# Patient Record
Sex: FEMALE
[De-identification: names, ages, dates, MRNs, and addresses within clinical notes are randomized per-mention and may not be internally consistent; named-entity substitution may affect disease eponyms.]

---

## 2017-10-27 ENCOUNTER — HOSPITAL ENCOUNTER (OUTPATIENT)
Dept: HOSPITAL 14 - H.ER | Age: 52
Setting detail: OBSERVATION
LOS: 1 days | Discharge: HOME | End: 2017-10-28
Attending: INTERNAL MEDICINE | Admitting: INTERNAL MEDICINE
Payer: COMMERCIAL

## 2017-10-27 DIAGNOSIS — E78.1: ICD-10-CM

## 2017-10-27 DIAGNOSIS — E78.5: ICD-10-CM

## 2017-10-27 DIAGNOSIS — Z79.899: ICD-10-CM

## 2017-10-27 DIAGNOSIS — Z23: ICD-10-CM

## 2017-10-27 DIAGNOSIS — R51: Primary | ICD-10-CM

## 2017-10-27 DIAGNOSIS — E78.00: ICD-10-CM

## 2017-10-27 PROCEDURE — 81025 URINE PREGNANCY TEST: CPT

## 2017-10-27 PROCEDURE — 71020: CPT

## 2017-10-27 PROCEDURE — 99285 EMERGENCY DEPT VISIT HI MDM: CPT

## 2017-10-27 PROCEDURE — 93005 ELECTROCARDIOGRAM TRACING: CPT

## 2017-10-27 PROCEDURE — 81003 URINALYSIS AUTO W/O SCOPE: CPT

## 2017-10-27 PROCEDURE — 80053 COMPREHEN METABOLIC PANEL: CPT

## 2017-10-27 PROCEDURE — 84484 ASSAY OF TROPONIN QUANT: CPT

## 2017-10-27 PROCEDURE — 85025 COMPLETE CBC W/AUTO DIFF WBC: CPT

## 2017-10-28 VITALS
SYSTOLIC BLOOD PRESSURE: 104 MMHG | RESPIRATION RATE: 18 BRPM | OXYGEN SATURATION: 99 % | DIASTOLIC BLOOD PRESSURE: 67 MMHG | TEMPERATURE: 98.1 F | HEART RATE: 76 BPM

## 2017-10-28 LAB
ALBUMIN/GLOB SERPL: 1.4 {RATIO} (ref 1–2.1)
ALP SERPL-CCNC: 95 U/L (ref 38–126)
ALT SERPL-CCNC: 50 U/L (ref 9–52)
AST SERPL-CCNC: 84 U/L (ref 14–36)
BACTERIA #/AREA URNS HPF: (no result) /[HPF]
BASOPHILS # BLD AUTO: 0.1 K/UL (ref 0–0.2)
BASOPHILS NFR BLD: 0.8 % (ref 0–2)
BILIRUB SERPL-MCNC: 0.2 MG/DL (ref 0.2–1.3)
BILIRUB UR-MCNC: NEGATIVE MG/DL
BUN SERPL-MCNC: 16 MG/DL (ref 7–17)
CALCIUM SERPL-MCNC: 9.8 MG/DL (ref 8.4–10.2)
CHLORIDE SERPL-SCNC: 107 MMOL/L (ref 98–107)
CO2 SERPL-SCNC: 25 MMOL/L (ref 22–30)
COLOR UR: (no result)
EOSINOPHIL # BLD AUTO: 0.1 K/UL (ref 0–0.7)
EOSINOPHIL NFR BLD: 0.9 % (ref 0–4)
ERYTHROCYTE [DISTWIDTH] IN BLOOD BY AUTOMATED COUNT: 13.3 % (ref 11.5–14.5)
GLOBULIN SER-MCNC: 3.4 GM/DL (ref 2.2–3.9)
GLUCOSE SERPL-MCNC: 98 MG/DL (ref 65–105)
GLUCOSE UR STRIP-MCNC: (no result) MG/DL
HCT VFR BLD CALC: 40.8 % (ref 34–47)
KETONES UR STRIP-MCNC: NEGATIVE MG/DL
LEUKOCYTE ESTERASE UR-ACNC: (no result) LEU/UL
LYMPHOCYTES # BLD AUTO: 2 K/UL (ref 1–4.3)
LYMPHOCYTES NFR BLD AUTO: 24.2 % (ref 20–40)
MCH RBC QN AUTO: 29.6 PG (ref 27–31)
MCHC RBC AUTO-ENTMCNC: 33.9 G/DL (ref 33–37)
MCV RBC AUTO: 87.3 FL (ref 81–99)
MONOCYTES # BLD: 0.6 K/UL (ref 0–0.8)
MONOCYTES NFR BLD: 7.5 % (ref 0–10)
NEUTROPHILS # BLD: 5.5 K/UL (ref 1.8–7)
NEUTROPHILS NFR BLD AUTO: 66.6 % (ref 50–75)
NRBC BLD AUTO-RTO: 0.1 % (ref 0–0)
PH UR STRIP: 7 [PH] (ref 5–8)
PLATELET # BLD: 235 K/UL (ref 130–400)
PMV BLD AUTO: 9 FL (ref 7.2–11.7)
POTASSIUM SERPL-SCNC: 4 MMOL/L (ref 3.6–5)
PROT SERPL-MCNC: 8 G/DL (ref 6.3–8.2)
PROT UR STRIP-MCNC: NEGATIVE MG/DL
RBC # UR STRIP: NEGATIVE /UL
RBC #/AREA URNS HPF: 3 /HPF (ref 0–3)
SODIUM SERPL-SCNC: 145 MMOL/L (ref 132–148)
SP GR UR STRIP: 1.01 (ref 1–1.03)
UROBILINOGEN UR-MCNC: (no result) MG/DL (ref 0.2–1)
WBC # BLD AUTO: 8.2 K/UL (ref 4.8–10.8)
WBC #/AREA URNS HPF: 1 /HPF (ref 0–5)

## 2017-10-28 NOTE — CARD
--------------- APPROVED REPORT --------------





EKG Measurement

Heart [id number]

HI [id number]

[id number]

[id number]

[id number]



<Conclusion>

Normal sinus rhythm

Nonspecific T wave abnormality

Abnormal ECG

## 2017-10-28 NOTE — CARD
--------------- APPROVED REPORT --------------





EKG Measurement

Heart [id number]

IN [id number]

[id number]

[id number]

[id number]



<Conclusion>

Normal sinus rhythm

Cannot rule out Anterior infarct, age undetermined

Abnormal ECG

## 2017-10-28 NOTE — ED PDOC
HPI: Chest Pain


Time Seen by Provider: 10/27/17 23:40


Chief Complaint (Nursing): Chest Pain


Chief Complaint (Provider): Chest Pain


History Per: Patient


History/Exam Limitations: no limitations


Current Symptoms Are (Timing): Still Present


Additional Complaint(s): 





Patient is a 53 y/o  female with a history of hypertriglyceridemia and 

dyslipidemia who presents to the ED with acute left sided chest pain that 

radiates down her left arm. She admits to nausea and a mild headache but denies 

vomiting and diaphoresis.





PMD: None Provided





Past Medical History


Reviewed: Historical Data, Nursing Documentation, Vital Signs


Vital Signs: 


 Last Vital Signs











Temp  98.1 F   10/28/17 05:09


 


Pulse  60   10/28/17 05:09


 


Resp  18   10/28/17 05:09


 


BP  113/74   10/28/17 05:09


 


Pulse Ox  98   10/28/17 05:09














- Medical History


PMH: Hypercholesterolemia


Other PMH: hypertriglyceridemia, dyslipidemia 





- Surgical History


Surgical History: No Surg Hx





- Family History


Family History: States: Unknown Family Hx





- Social History


Current smoker - smoking cessation education provided: No


Ex-Smoker (has not smoked in the last 12 months): No


Alcohol: None


Drugs: Denies





- Home Medications


Home Medications: 


 Ambulatory Orders











 Medication  Instructions  Recorded


 


Simvastatin [Zocor] 20 mg PO DAILY 10/28/17














- Allergies


Allergies/Adverse Reactions: 


 Allergies











Allergy/AdvReac Type Severity Reaction Status Date / Time


 


No Known Allergies Allergy   Verified 11/29/16 13:37














Review of Systems


ROS Statement: Except As Marked, All Systems Reviewed And Found Negative


Constitutional: Negative for: Sweats


Cardiovascular: Positive for: Chest Pain


Gastrointestinal: Positive for: Nausea.  Negative for: Vomiting





Physical Exam





- Reviewed


Nursing Documentation Reviewed: Yes


Vital Signs Reviewed: Yes





- Physical Exam


Appears: Positive for: Non-toxic, No Acute Distress


Head Exam: Positive for: ATRAUMATIC, NORMAL INSPECTION, NORMOCEPHALIC


Skin: Positive for: Normal Color, Warm, Dry


Eye Exam: Positive for: Normal appearance, EOMI, PERRL.  Negative for: Nystagmus


ENT: Positive for: Normal ENT Inspection


Neck: Positive for: Normal, Painless ROM, Supple


Cardiovascular/Chest: Positive for: Regular Rate, Rhythm.  Negative for: Edema, 

Murmur


Respiratory: Positive for: Normal Breath Sounds.  Negative for: Wheezing, 

Respiratory Distress


Gastrointestinal/Abdominal: Positive for: Normal Exam, Bowel Sounds, Soft.  

Negative for: Tenderness


Back: Positive for: Normal Inspection


Extremity: Positive for: Normal ROM.  Negative for: Pedal Edema, Deformity


Neurologic/Psych: Positive for: Alert, Oriented





- Laboratory Results


Result Diagrams: 


 10/28/17 01:32





 10/28/17 01:32





- ECG


O2 Sat by Pulse Oximetry: 99 (RA)


Pulse Ox Interpretation: Normal





- Radiology


X-Ray: Interpreted by Me, Viewed By Me


X-Ray Interpretation: No Acute Disease





Medical Decision Making


Medical Decision Making: 





Time: 23:50


Initial Impression: 53 y/o female with chest pain in setting of dyslipidemia


Initial Plan:


--EKG


--CMP


--Troponin I


--Urine Pregnancy


--CBC


--CXR


--Aspirin


--Urinalysis





Time: 00:00


--Chest XR negative





Time: 2:20


--Labs were viewed and show no clinically significant abnormalities


--Patient placed on chest pain observation given risk factors including 

hypertriglycemia and dyslipidemia


--Referred to Dr. Kwong





--------------------------------------------------------------------------------

---------------


Scribe Attestation:


Documented by Kike Fowler, acting as a scribe for Dr. Porfirio Guerra MD.





Provider Scribe Attestation:


All medical record entries made by the Scribe were at my direction and 

personally dictated by me. I have reviewed the chart and agree that the record 

accurately reflects my personal performance of the history, physical exam, 

medical decision making, and the department course for this patient. I have 

also personally directed, reviewed, and agree with the discharge instructions 

and disposition.





Disposition





- Clinical Impression


Clinical Impression: 


 Chest pain








- Patient ED Disposition


Is Patient to be Admitted: Yes


Discussed With : Mando Mosley





- Disposition


Disposition Time: 01:00


Condition: FAIR





- Pt Status Changed To:


Hospital Disposition Of: Observation





- POA


Present On Arrival: None

## 2017-10-28 NOTE — CP.PCM.HP
Past Patient History





- Infectious Disease


Hx of Infectious Diseases: None





- Past Medical History & Family History


Past Medical History?: Yes





- Past Social History


Alcohol: None


Drugs: Denies





- CARDIAC


Hx Hypercholesterolemia: Yes





- PULMONARY


Hx Respiratory Disorders: No





- NEUROLOGICAL


Hx Neurological Disorder: No





- HEENT


Hx HEENT Problems: No





- RENAL


Hx Chronic Kidney Disease: No





- ENDOCRINE/METABOLIC


Hx Endocrine Disorders: No





- HEMATOLOGICAL/ONCOLOGICAL


Hx Blood Disorders: No


Hx AIDS: No


Hx Human Immunodeficiency Virus (HIV): No





- INTEGUMENTARY


Hx Dermatological Problems: No





- MUSCULOSKELETAL/RHEUMATOLOGICAL


Hx Musculoskeletal Disorders: No


Hx Falls: No





- GASTROINTESTINAL


Hx Gastrointestinal Disorders: No





- GENITOURINARY/GYNECOLOGICAL


Hx Genitourinary Disorders: No





- PSYCHIATRIC


Hx Psychophysiologic Disorder: No


Hx Substance Use: No





- SURGICAL HISTORY


Hx Surgeries: No





- ANESTHESIA


Hx Anesthesia: Yes


Hx Anesthesia Reactions: No


Hx Malignant Hyperthermia: No





Meds


Allergies/Adverse Reactions: 


 Allergies











Allergy/AdvReac Type Severity Reaction Status Date / Time


 


No Known Allergies Allergy   Verified 11/29/16 13:37














Results





- Vital Signs


Recent Vital Signs: 





 Last Vital Signs











Temp  98.1 F   10/28/17 17:01


 


Pulse  76   10/28/17 17:01


 


Resp  18   10/28/17 17:01


 


BP  104/67   10/28/17 17:01


 


Pulse Ox  99   10/28/17 17:01














- Labs


Result Diagrams: 


 10/28/17 01:32





 10/28/17 01:32


Labs: 





 Laboratory Results - last 24 hr











  10/28/17 10/28/17 10/28/17





  01:32 01:32 02:02


 


WBC  8.2  


 


RBC  4.67  


 


Hgb  13.8  


 


Hct  40.8  


 


MCV  87.3  


 


MCH  29.6  


 


MCHC  33.9  


 


RDW  13.3  


 


Plt Count  235  


 


MPV  9.0  


 


Neut % (Auto)  66.6  


 


Lymph % (Auto)  24.2  


 


Mono % (Auto)  7.5  


 


Eos % (Auto)  0.9  


 


Baso % (Auto)  0.8  


 


Neut #  5.5  


 


Lymph #  2.0  


 


Mono #  0.6  


 


Eos #  0.1  


 


Baso #  0.1  


 


Sodium   145 


 


Potassium   4.0 


 


Chloride   107 


 


Carbon Dioxide   25 


 


Anion Gap   18 


 


BUN   16 


 


Creatinine   0.7 


 


Est GFR ( Amer)   > 60 


 


Est GFR (Non-Af Amer)   > 60 


 


Random Glucose   98 


 


Calcium   9.8 


 


Total Bilirubin   0.2 


 


AST   84 H 


 


ALT   50 


 


Alkaline Phosphatase   95 


 


Troponin I   < 0.0120 


 


Total Protein   8.0 


 


Albumin   4.6 


 


Globulin   3.4 


 


Albumin/Globulin Ratio   1.4 


 


Urine Color    Blue


 


Urine Clarity    Cloudy


 


Urine pH    7.0


 


Ur Specific Gravity    1.006


 


Urine Protein    Negative


 


Urine Glucose (UA)    Neg


 


Urine Ketones    Negative


 


Urine Blood    Negative


 


Urine Nitrate    Negative


 


Urine Bilirubin    Negative


 


Urine Urobilinogen    0.2-1.0


 


Ur Leukocyte Esterase    Neg


 


Urine RBC (Auto)    3


 


Urine Microscopic WBC    1


 


Ur Squamous Epith Cells    < 1


 


Amorphous Sediment    Occ H


 


Urine Bacteria    Trace














  10/28/17 10/28/17





  09:00 14:30


 


WBC  


 


RBC  


 


Hgb  


 


Hct  


 


MCV  


 


MCH  


 


MCHC  


 


RDW  


 


Plt Count  


 


MPV  


 


Neut % (Auto)  


 


Lymph % (Auto)  


 


Mono % (Auto)  


 


Eos % (Auto)  


 


Baso % (Auto)  


 


Neut #  


 


Lymph #  


 


Mono #  


 


Eos #  


 


Baso #  


 


Sodium  


 


Potassium  


 


Chloride  


 


Carbon Dioxide  


 


Anion Gap  


 


BUN  


 


Creatinine  


 


Est GFR ( Amer)  


 


Est GFR (Non-Af Amer)  


 


Random Glucose  


 


Calcium  


 


Total Bilirubin  


 


AST  


 


ALT  


 


Alkaline Phosphatase  


 


Troponin I  < 0.0120  < 0.0120


 


Total Protein  


 


Albumin  


 


Globulin  


 


Albumin/Globulin Ratio  


 


Urine Color  


 


Urine Clarity  


 


Urine pH  


 


Ur Specific Gravity  


 


Urine Protein  


 


Urine Glucose (UA)  


 


Urine Ketones  


 


Urine Blood  


 


Urine Nitrate  


 


Urine Bilirubin  


 


Urine Urobilinogen  


 


Ur Leukocyte Esterase  


 


Urine RBC (Auto)  


 


Urine Microscopic WBC  


 


Ur Squamous Epith Cells  


 


Amorphous Sediment  


 


Urine Bacteria

## 2017-10-28 NOTE — CP.PCM.DIS
Provider





- Provider


Date of Admission: 


10/28/17 01:42





Attending physician: 


Mando Mosley MD





Time Spent in preparation of Discharge (in minutes): 15





Hospital Course





- Lab Results


Lab Results: 


 Most Recent Lab Values











WBC  8.2 K/uL (4.8-10.8)   10/28/17  01:32    


 


RBC  4.67 Mil/uL (3.80-5.20)   10/28/17  01:32    


 


Hgb  13.8 g/dL (12.0-16.0)   10/28/17  01:32    


 


Hct  40.8 % (34.0-47.0)   10/28/17  01:32    


 


MCV  87.3 fl (81.0-99.0)   10/28/17  01:32    


 


MCH  29.6 pg (27.0-31.0)   10/28/17  01:32    


 


MCHC  33.9 g/dL (33.0-37.0)   10/28/17  01:32    


 


RDW  13.3 % (11.5-14.5)   10/28/17  01:32    


 


Plt Count  235 K/uL (130-400)   10/28/17  01:32    


 


MPV  9.0 fl (7.2-11.7)   10/28/17  01:32    


 


Neut % (Auto)  66.6 % (50.0-75.0)   10/28/17  01:32    


 


Lymph % (Auto)  24.2 % (20.0-40.0)   10/28/17  01:32    


 


Mono % (Auto)  7.5 % (0.0-10.0)   10/28/17  01:32    


 


Eos % (Auto)  0.9 % (0.0-4.0)   10/28/17  01:32    


 


Baso % (Auto)  0.8 % (0.0-2.0)   10/28/17  01:32    


 


Neut #  5.5 K/uL (1.8-7.0)   10/28/17  01:32    


 


Lymph #  2.0 K/uL (1.0-4.3)   10/28/17  01:32    


 


Mono #  0.6 K/uL (0.0-0.8)   10/28/17  01:32    


 


Eos #  0.1 K/uL (0.0-0.7)   10/28/17  01:32    


 


Baso #  0.1 K/uL (0.0-0.2)   10/28/17  01:32    


 


Sodium  145 mmol/l (132-148)   10/28/17  01:32    


 


Potassium  4.0 MMOL/L (3.6-5.0)   10/28/17  01:32    


 


Chloride  107 mmol/L ()   10/28/17  01:32    


 


Carbon Dioxide  25 mmol/L (22-30)   10/28/17  01:32    


 


Anion Gap  18  (10-20)   10/28/17  01:32    


 


BUN  16 mg/dl (7-17)   10/28/17  01:32    


 


Creatinine  0.7 mg/dL (0.7-1.2)   10/28/17  01:32    


 


Est GFR ( Amer)  > 60   10/28/17  01:32    


 


Est GFR (Non-Af Amer)  > 60   10/28/17  01:32    


 


Random Glucose  98 mg/dL ()   10/28/17  01:32    


 


Calcium  9.8 mg/dL (8.4-10.2)   10/28/17  01:32    


 


Total Bilirubin  0.2 mg/dl (0.2-1.3)   10/28/17  01:32    


 


AST  84 U/L (14-36)  H  10/28/17  01:32    


 


ALT  50 U/L (9-52)   10/28/17  01:32    


 


Alkaline Phosphatase  95 U/L ()   10/28/17  01:32    


 


Troponin I  < 0.0120 ng/mL (0.00-0.120)   10/28/17  14:30    


 


Total Protein  8.0 G/DL (6.3-8.2)   10/28/17  01:32    


 


Albumin  4.6 g/dL (3.5-5.0)   10/28/17  01:32    


 


Globulin  3.4 gm/dL (2.2-3.9)   10/28/17  01:32    


 


Albumin/Globulin Ratio  1.4  (1.0-2.1)   10/28/17  01:32    


 


Urine Color  Blue  (YELLOW)   10/28/17  02:02    


 


Urine Clarity  Cloudy  (Clear)   10/28/17  02:02    


 


Urine pH  7.0  (5.0-8.0)   10/28/17  02:02    


 


Ur Specific Gravity  1.006  (1.003-1.030)   10/28/17  02:02    


 


Urine Protein  Negative mg/dL (NEGATIVE)   10/28/17  02:02    


 


Urine Glucose (UA)  Neg mg/dL (Normal)   10/28/17  02:02    


 


Urine Ketones  Negative mg/dL (NEGATIVE)   10/28/17  02:02    


 


Urine Blood  Negative  (NEGATIVE)   10/28/17  02:02    


 


Urine Nitrate  Negative  (NEGATIVE)   10/28/17  02:02    


 


Urine Bilirubin  Negative  (NEGATIVE)   10/28/17  02:02    


 


Urine Urobilinogen  0.2-1.0 mg/dL (0.2-1.0)   10/28/17  02:02    


 


Ur Leukocyte Esterase  Neg Cynthia/uL (Negative)   10/28/17  02:02    


 


Urine RBC (Auto)  3 /hpf (0-3)   10/28/17  02:02    


 


Urine Microscopic WBC  1 /hpf (0-5)   10/28/17  02:02    


 


Ur Squamous Epith Cells  < 1 /hpf (0-5)   10/28/17  02:02    


 


Amorphous Sediment  Occ /ul (<OCC)  H  10/28/17  02:02    


 


Urine Bacteria  Trace  (<OCC)   10/28/17  02:02    














Discharge Exam





- Head Exam


Head Exam: ATRAUMATIC, NORMAL INSPECTION, NORMOCEPHALIC





Discharge Plan





- Discharge Medications


Prescriptions: 


Aspirin [Aspirin Chewable] 81 mg PO DAILY #30 ctb


Naproxen [Naprosyn] 500 mg PO BID PRN #20 tablet


 PRN Reason: Headache





- Follow Up Plan


Condition: FAIR


Disposition: HOME/ ROUTINE

## 2017-10-28 NOTE — RAD
HISTORY:



COMPARISON:

No prior.



TECHNIQUE:

Chest PA and lateral



FINDINGS:



LINES AND TUBES:

None. 



LUNG AND PLEURA:

The lungs are well inflated and clear. 



HEART AND MEDIASTINUM:

The heart is not enlarged. The hilar and mediastinal contours are 

within normal limits.



SKELETAL STRUCTURES:

The bony structures are within normal limits for the patient's age.



VISUALIZED UPPER ABDOMEN:

Normal.



OTHER FINDINGS:

None.



IMPRESSION:

No active pulmonary disease.

## 2017-10-29 NOTE — CARD
--------------- APPROVED REPORT --------------





EKG Measurement

Heart [id number]

RI 182P39

[id number]

[id number]

[id number]



<Conclusion>

Normal sinus rhythm

Nonspecific T wave abnormality

Abnormal ECG

## 2018-04-17 ENCOUNTER — HOSPITAL ENCOUNTER (OUTPATIENT)
Dept: HOSPITAL 14 - H.ER | Age: 53
Setting detail: OBSERVATION
LOS: 2 days | Discharge: HOME | End: 2018-04-19
Attending: FAMILY MEDICINE | Admitting: FAMILY MEDICINE
Payer: COMMERCIAL

## 2018-04-17 DIAGNOSIS — K85.90: Primary | ICD-10-CM

## 2018-04-17 DIAGNOSIS — E78.1: ICD-10-CM

## 2018-04-17 DIAGNOSIS — E78.00: ICD-10-CM

## 2018-04-17 LAB
ALBUMIN SERPL-MCNC: 4.4 G/DL (ref 3.5–5)
ALBUMIN/GLOB SERPL: 1.2 {RATIO} (ref 1–2.1)
ALT SERPL-CCNC: 35 U/L (ref 9–52)
AMYLASE SERPL-CCNC: 257 U/L (ref 30–110)
APTT BLD: 36.1 SECONDS (ref 25.6–37.1)
AST SERPL-CCNC: 29 U/L (ref 14–36)
BASOPHILS # BLD AUTO: 0.1 K/UL (ref 0–0.2)
BASOPHILS NFR BLD: 0.7 % (ref 0–2)
BUN SERPL-MCNC: 15 MG/DL (ref 7–17)
CALCIUM SERPL-MCNC: 9.2 MG/DL (ref 8.4–10.2)
EOSINOPHIL # BLD AUTO: 0.1 K/UL (ref 0–0.7)
EOSINOPHIL NFR BLD: 1.4 % (ref 0–4)
ERYTHROCYTE [DISTWIDTH] IN BLOOD BY AUTOMATED COUNT: 13.1 % (ref 11.5–14.5)
GFR NON-AFRICAN AMERICAN: > 60
HGB BLD-MCNC: 13.6 G/DL (ref 12–16)
INR PPP: 1 (ref 0.9–1.2)
LIPASE SERPL-CCNC: 2334 U/L (ref 23–300)
LYMPHOCYTES # BLD AUTO: 2.7 K/UL (ref 1–4.3)
LYMPHOCYTES NFR BLD AUTO: 26.6 % (ref 20–40)
MCH RBC QN AUTO: 29.7 PG (ref 27–31)
MCHC RBC AUTO-ENTMCNC: 33.6 G/DL (ref 33–37)
MCV RBC AUTO: 88.4 FL (ref 81–99)
MONOCYTES # BLD: 0.7 K/UL (ref 0–0.8)
MONOCYTES NFR BLD: 6.5 % (ref 0–10)
NEUTROPHILS # BLD: 6.6 K/UL (ref 1.8–7)
NEUTROPHILS NFR BLD AUTO: 64.8 % (ref 50–75)
NRBC BLD AUTO-RTO: 0.1 % (ref 0–0)
PLATELET # BLD: 243 K/UL (ref 130–400)
PMV BLD AUTO: 8.6 FL (ref 7.2–11.7)
PROTHROMBIN TIME: 10.7 SECONDS (ref 9.8–13.1)
RBC # BLD AUTO: 4.58 MIL/UL (ref 3.8–5.2)
WBC # BLD AUTO: 10.2 K/UL (ref 4.8–10.8)

## 2018-04-17 PROCEDURE — 85025 COMPLETE CBC W/AUTO DIFF WBC: CPT

## 2018-04-17 PROCEDURE — 80053 COMPREHEN METABOLIC PANEL: CPT

## 2018-04-17 PROCEDURE — 80061 LIPID PANEL: CPT

## 2018-04-17 PROCEDURE — 76705 ECHO EXAM OF ABDOMEN: CPT

## 2018-04-17 PROCEDURE — 81025 URINE PREGNANCY TEST: CPT

## 2018-04-17 PROCEDURE — 83690 ASSAY OF LIPASE: CPT

## 2018-04-17 PROCEDURE — 96374 THER/PROPH/DIAG INJ IV PUSH: CPT

## 2018-04-17 PROCEDURE — 85610 PROTHROMBIN TIME: CPT

## 2018-04-17 PROCEDURE — 82150 ASSAY OF AMYLASE: CPT

## 2018-04-17 PROCEDURE — 36415 COLL VENOUS BLD VENIPUNCTURE: CPT

## 2018-04-17 PROCEDURE — 96360 HYDRATION IV INFUSION INIT: CPT

## 2018-04-17 PROCEDURE — 99285 EMERGENCY DEPT VISIT HI MDM: CPT

## 2018-04-17 PROCEDURE — 83615 LACTATE (LD) (LDH) ENZYME: CPT

## 2018-04-17 PROCEDURE — 85730 THROMBOPLASTIN TIME PARTIAL: CPT

## 2018-04-17 RX ADMIN — SODIUM CHLORIDE, SODIUM LACTATE, POTASSIUM CHLORIDE, CALCIUM CHLORIDE AND DEXTROSE MONOHYDRATE SCH MLS/HR: 5; 600; 310; 30; 20 INJECTION, SOLUTION INTRAVENOUS at 23:28

## 2018-04-17 NOTE — ED PDOC
HPI: Abdomen


Time Seen by Provider: 04/17/18 19:03


Chief Complaint (Nursing): Abdominal Pain


Chief Complaint (Provider): Abdominal pain


History Per: Patient


History/Exam Limitations: no limitations


Onset/Duration Of Symptoms: Days (2)


Outside of [place] travel?: No


Current Symptoms Are (Timing): Still Present


Location Of Pain/Discomfort: RUQ, Epigastric


Associated Symptoms: Back Pain.  denies: Nausea, Vomiting, Diarrhea, Loss Of 

Appetite, Constipation, Urinary Symptoms


Additional History Per: Patient


Additional Complaint(s): 


51yo female with history of high triglycerides, presents to ED with complaints 

of abdominal pain for the past 2 days, mainly in her epigastric an right upper 

quadrant. Patient states the pain is radiating to her back and is constant, 

with intermittent episodes where it becomes severe and sharp. She states the 

pain worsens with deep breathing. She reports normal PO intake and normal 

appetite; denies any nausea, vomiting, diarrhea, constipation or urinary 

symptoms. Patient states she took some Pepto Bismol without relief of her 

symptoms. She has no other medical complaints. 


PMD: Dr. Mendenhall





Past Medical History


Reviewed: Historical Data, Nursing Documentation, Vital Signs


Vital Signs: 


 Last Vital Signs











Temp  98 F   04/19/18 16:28


 


Pulse  67   04/19/18 16:28


 


Resp  20   04/19/18 16:28


 


BP  124/79   04/19/18 16:28


 


Pulse Ox  97   04/19/18 16:28














- Medical History


PMH: Hypercholesterolemia


   Denies: HIV, Chronic Kidney Disease





- Surgical History


Surgical History: No Surg Hx





- Family History


Family History: States: Unknown Family Hx





- Social History


Current smoker - smoking cessation education provided: No


Alcohol: None


Drugs: Denies





- Home Medications


Home Medications: 


 Ambulatory Orders











 Medication  Instructions  Recorded


 


Simvastatin [Zocor] 20 mg PO DAILY 10/28/17


 


Famotidine [Pepcid] 20 mg PO DAILY #30 tab 04/19/18














- Allergies


Allergies/Adverse Reactions: 


 Allergies











Allergy/AdvReac Type Severity Reaction Status Date / Time


 


No Known Allergies Allergy   Verified 11/29/16 13:37














Review of Systems


ROS Statement: Except As Marked, All Systems Reviewed And Found Negative (as 

per HPI)


Gastrointestinal: Positive for: Abdominal Pain.  Negative for: Nausea, Vomiting

, Diarrhea, Constipation


Genitourinary Female: Negative for: Dysuria, Frequency, Hematuria


Musculoskeletal: Positive for: Back Pain





Physical Exam





- Reviewed


Nursing Documentation Reviewed: Yes


Vital Signs Reviewed: Yes





- Physical Exam


Appears: Positive for: Uncomfortable, In Acute Distress


Head Exam: Positive for: ATRAUMATIC, NORMAL INSPECTION, NORMOCEPHALIC


Skin: Positive for: Warm, Dry.  Negative for: Jaundice


Eye Exam: Positive for: EOMI, PERRL


ENT: Negative for: Pharyngeal Erythema, Tonsillar Exudate


Neck: Positive for: Painless ROM, Supple


Cardiovascular/Chest: Positive for: Regular Rate, Rhythm.  Negative for: Murmur


Respiratory: Positive for: Normal Breath Sounds.  Negative for: Wheezing


Gastrointestinal/Abdominal: Positive for: Soft, Tenderness (epigastric, right 

upper quadrant; (+) Linda's sign (-) McBurney's point tenderness).  Negative 

for: Mass, Distended, Guarding, Rebound


Back: Positive for: R CVA Tenderness.  Negative for: Decreased ROM


Extremity: Positive for: Normal ROM.  Negative for: Deformity


Lymphatic: Negative for: Adenopathy


Neurologic/Psych: Positive for: Alert.  Negative for: Motor/Sensory Deficits





- Laboratory Results


Result Diagrams: 


 04/18/18 06:10





 04/18/18 06:10





- ECG


O2 Sat by Pulse Oximetry: 99 (RA)


Pulse Ox Interpretation: Normal





Medical Decision Making


Medical Decision Making: 


Impression: Abdominal pain


Differential (including but not limited to): Gallstones, pancreatitis, 

cholecystitis, hepatitis, renal colic


Plan:


-- Labs


-- Toradol 15 mg IVP


-- Morphine 4mg IVP


-- US Gallbladder and hepatic





Time: 2045


US Gallbladder and Hepatic FINDINGS:


Liver: Unremarkable. No intrahepatic bile duct dilation.


Gallbladder: Unremarkable. No gallstones.


Common bile duct: Measures 4 mm.


Pancreas: Unremarkable as visualized.


Right kidney: Unremarkable. No hydronephrosis.


IMPRESSION:


No evidence of cholelithiasis.





Time: 2100


US findings discussed with patient.


Case discussed with Dr. Smalls and orders placed per discussion.





Time: 2138


Case discussed with Dr. Monroy, patient to be admitted under his service for 

pancreatitis. 


Plan for admission discussed with patient who is agreeable.


--------------------------------------------------------------------------------

------------------------------------





Scribe Attestation:


Documented by Adelaide Weinstein acting as a scribe for Gloria Marques MD. 





Provider Attestation:


All medical record entries made by the Scribe were at my direction and 

personally dictated by me. I have reviewed the chart and agree that the record 

accurately reflects my personal performance of the history, physical exam, 

medical decision making, and the department course for this patient. I have 

also personally directed, reviewed, and agree with the discharge instructions 

and disposition.








Disposition





- Clinical Impression


Clinical Impression: 


 Pancreatitis, Hypertriglyceridemia








- Disposition


Disposition Time: 21:00


Condition: FAIR





- Pt Status Changed To:


Hospital Disposition Of: Observation





- POA


Present On Arrival: None

## 2018-04-18 LAB
ALBUMIN SERPL-MCNC: 3.5 G/DL (ref 3.5–5)
ALBUMIN/GLOB SERPL: 1.2 {RATIO} (ref 1–2.1)
ALT SERPL-CCNC: 32 U/L (ref 9–52)
AST SERPL-CCNC: 22 U/L (ref 14–36)
BASOPHILS # BLD AUTO: 0.1 K/UL (ref 0–0.2)
BASOPHILS NFR BLD: 0.7 % (ref 0–2)
BUN SERPL-MCNC: 15 MG/DL (ref 7–17)
CALCIUM SERPL-MCNC: 8.9 MG/DL (ref 8.4–10.2)
EOSINOPHIL # BLD AUTO: 0.2 K/UL (ref 0–0.7)
EOSINOPHIL NFR BLD: 2.9 % (ref 0–4)
ERYTHROCYTE [DISTWIDTH] IN BLOOD BY AUTOMATED COUNT: 13.2 % (ref 11.5–14.5)
GFR NON-AFRICAN AMERICAN: > 60
HDLC SERPL-MCNC: 38 MG/DL (ref 30–70)
HGB BLD-MCNC: 12.5 G/DL (ref 12–16)
LDLC SERPL-MCNC: 97 MG/DL (ref 0–129)
LYMPHOCYTES # BLD AUTO: 2.4 K/UL (ref 1–4.3)
LYMPHOCYTES NFR BLD AUTO: 34.4 % (ref 20–40)
MCH RBC QN AUTO: 30 PG (ref 27–31)
MCHC RBC AUTO-ENTMCNC: 33.7 G/DL (ref 33–37)
MCV RBC AUTO: 89.1 FL (ref 81–99)
MONOCYTES # BLD: 0.6 K/UL (ref 0–0.8)
MONOCYTES NFR BLD: 8.5 % (ref 0–10)
NEUTROPHILS # BLD: 3.8 K/UL (ref 1.8–7)
NEUTROPHILS NFR BLD AUTO: 53.5 % (ref 50–75)
NRBC BLD AUTO-RTO: 0.1 % (ref 0–0)
PLATELET # BLD: 216 K/UL (ref 130–400)
PMV BLD AUTO: 8.7 FL (ref 7.2–11.7)
RBC # BLD AUTO: 4.15 MIL/UL (ref 3.8–5.2)
WBC # BLD AUTO: 7.1 K/UL (ref 4.8–10.8)

## 2018-04-18 RX ADMIN — SODIUM CHLORIDE, SODIUM LACTATE, POTASSIUM CHLORIDE, CALCIUM CHLORIDE AND DEXTROSE MONOHYDRATE SCH MLS/HR: 5; 600; 310; 30; 20 INJECTION, SOLUTION INTRAVENOUS at 11:08

## 2018-04-18 RX ADMIN — SODIUM CHLORIDE, SODIUM LACTATE, POTASSIUM CHLORIDE, CALCIUM CHLORIDE AND DEXTROSE MONOHYDRATE SCH: 5; 600; 310; 30; 20 INJECTION, SOLUTION INTRAVENOUS at 05:30

## 2018-04-18 RX ADMIN — SODIUM CHLORIDE, SODIUM LACTATE, POTASSIUM CHLORIDE, CALCIUM CHLORIDE AND DEXTROSE MONOHYDRATE SCH MLS/HR: 5; 600; 310; 30; 20 INJECTION, SOLUTION INTRAVENOUS at 18:33

## 2018-04-18 NOTE — CP.PCM.HP
History of Present Illness





- History of Present Illness


History of Present Illness: 





CC: abdominal pain





HPI:  51 y/o woman w/ pmh of high triglycerides presented to ED with complaints 

of abdominal pain.  Patient reports pain started on the right side but now 

epigastric/left sided, started 2 day ago, radiating to her back and is constant

, with intermittent episodes where it becomes severe and sharp.  Patient states 

the pain worsens with deep breathing.  Patient states she took Pepto Bismol 

without relief of her symptoms.  Patient reports regular PO intake and normal 

appetite.  Patient reports mild tension headache but denies chest pain, SOB, 

nausea, vomiting, diarrhea, constipation or urinary symptoms. She has no other 

medical complaints. 





PMD: Dr. Mendenhall


PMH: hypertriglyceridemia


meds: see med list


Allergies: NKDA


PSH: denies


Fam: denies


SOC: denies smoking, alcohol, and drugs





ROS: 12 points assessed and negative unless otherwise reported in HPI





Present on Admission





- Present on Admission


Any Indicators Present on Admission: No


History of DVT/PE: No


History of Uncontrolled Diabetes: No


Urinary Catheter: No


Decubitus Ulcer Present: No





Review of Systems





- Constitutional


Constitutional: As Per HPI, Headache.  absent: Chills, Fever





- EENT


Eyes: absent: Change in Vision





- Cardiovascular


Cardiovascular: absent: Chest Pain





- Respiratory


Respiratory: absent: Dyspnea





- Gastrointestinal


Gastrointestinal: As Per HPI, Abdominal Pain.  absent: Constipation, Diarrhea, 

Hematochezia, Melena, Nausea, Vomiting





- Genitourinary


Genitourinary: absent: Dysuria





- Integumentary


Integumentary: absent: Rash





Past Patient History





- Infectious Disease


Hx of Infectious Diseases: None





- Past Medical History & Family History


Past Medical History?: Yes





- Past Social History


Smoking Status: Never Smoked





- CARDIAC


Hx Cardiac Disorders: Yes


Hx Hypercholesterolemia: Yes





- PULMONARY


Hx Respiratory Disorders: No





- NEUROLOGICAL


Hx Neurological Disorder: No





- HEENT


Hx HEENT Problems: No





- RENAL


Hx Chronic Kidney Disease: No





- ENDOCRINE/METABOLIC


Hx Endocrine Disorders: No





- HEMATOLOGICAL/ONCOLOGICAL


Hx Blood Disorders: No


Hx Human Immunodeficiency Virus (HIV): No





- INTEGUMENTARY


Hx Dermatological Problems: No





- MUSCULOSKELETAL/RHEUMATOLOGICAL


Hx Musculoskeletal Disorders: No


Hx Falls: No





- GASTROINTESTINAL


Hx Gastrointestinal Disorders: No





- GENITOURINARY/GYNECOLOGICAL


Hx Genitourinary Disorders: No





- PSYCHIATRIC


Hx Psychophysiologic Disorder: No


Hx Substance Use: No





- SURGICAL HISTORY


Hx Surgeries: No





- ANESTHESIA


Hx Anesthesia: Yes


Hx Anesthesia Reactions: No


Hx Malignant Hyperthermia: No





Meds


Allergies/Adverse Reactions: 


 Allergies











Allergy/AdvReac Type Severity Reaction Status Date / Time


 


No Known Allergies Allergy   Verified 11/29/16 13:37














Physical Exam





- Constitutional


Appears: Non-toxic, No Acute Distress





- Head Exam


Head Exam: ATRAUMATIC, NORMAL INSPECTION, NORMOCEPHALIC





- Eye Exam


Eye Exam: Normal appearance





- ENT Exam


ENT Exam: Mucous Membranes Moist





- Neck Exam


Neck exam: Positive for: Full Rom.  Negative for: Tenderness





- Respiratory Exam


Respiratory Exam: Clear to Auscultation Bilateral.  absent: Accessory Muscle Use

, Decreased Breath Sounds, Rales, Rhonchi, Wheezes, Respiratory Distress





- Cardiovascular Exam


Cardiovascular Exam: REGULAR RHYTHM.  absent: Tachycardia





- GI/Abdominal Exam


GI & Abdominal Exam: Normal Bowel Sounds, Soft, Tenderness (epigastric and left 

upper quadrant).  absent: Distended, Guarding, Rebound





- Extremities Exam


Extremities exam: Negative for: calf tenderness, pedal edema, tenderness





- Neurological Exam


Neurological exam: Alert, Oriented x3





- Skin


Skin Exam: Dry, Intact, Normal Color, Warm





Results





- Vital Signs


Recent Vital Signs: 





 Last Vital Signs











Temp  97.9 F   04/18/18 08:23


 


Pulse  71   04/18/18 08:23


 


Resp  18   04/18/18 08:23


 


BP  106/71   04/18/18 08:23


 


Pulse Ox  97   04/18/18 08:23














- Labs


Result Diagrams: 


 04/18/18 06:10





 04/18/18 06:10


Labs: 





 Laboratory Results - last 24 hr











  04/17/18 04/17/18 04/17/18





  19:41 19:41 19:41


 


WBC   10.2 


 


RBC   4.58 


 


Hgb   13.6 


 


Hct   40.5 


 


MCV   88.4 


 


MCH   29.7 


 


MCHC   33.6 


 


RDW   13.1 


 


Plt Count   243 


 


MPV   8.6 


 


Neut % (Auto)   64.8 


 


Lymph % (Auto)   26.6 


 


Mono % (Auto)   6.5 


 


Eos % (Auto)   1.4 


 


Baso % (Auto)   0.7 


 


Neut # (Auto)   6.6 


 


Lymph # (Auto)   2.7 


 


Mono # (Auto)   0.7 


 


Eos # (Auto)   0.1 


 


Baso # (Auto)   0.1 


 


PT    10.7


 


INR    1.0


 


APTT    36.1


 


Sodium  145  


 


Potassium  4.0  


 


Chloride  106  


 


Carbon Dioxide  24  


 


Anion Gap  19  


 


BUN  15  


 


Creatinine  0.8  


 


Est GFR ( Amer)  > 60  


 


Est GFR (Non-Af Amer)  > 60  


 


Random Glucose  91  


 


Calcium  9.2  


 


Total Bilirubin  0.4  


 


AST  29  


 


ALT  35  


 


Alkaline Phosphatase  78  


 


Lactate Dehydrogenase  445  


 


Total Protein  8.0  


 


Albumin  4.4  


 


Globulin  3.6  


 


Albumin/Globulin Ratio  1.2  


 


Triglycerides   


 


Cholesterol   


 


LDL Cholesterol Direct   


 


HDL Cholesterol   


 


Amylase  257 H  


 


Lipase  2334 H  














  04/18/18 04/18/18





  06:10 06:10


 


WBC   7.1


 


RBC   4.15


 


Hgb   12.5


 


Hct   37.0


 


MCV   89.1


 


MCH   30.0


 


MCHC   33.7


 


RDW   13.2


 


Plt Count   216


 


MPV   8.7


 


Neut % (Auto)   53.5


 


Lymph % (Auto)   34.4


 


Mono % (Auto)   8.5


 


Eos % (Auto)   2.9


 


Baso % (Auto)   0.7


 


Neut # (Auto)   3.8


 


Lymph # (Auto)   2.4


 


Mono # (Auto)   0.6


 


Eos # (Auto)   0.2


 


Baso # (Auto)   0.1


 


PT  


 


INR  


 


APTT  


 


Sodium  147 


 


Potassium  4.2 


 


Chloride  110 H 


 


Carbon Dioxide  24 


 


Anion Gap  17 


 


BUN  15 


 


Creatinine  0.7 


 


Est GFR ( Amer)  > 60 


 


Est GFR (Non-Af Amer)  > 60 


 


Random Glucose  92 


 


Calcium  8.9 


 


Total Bilirubin  0.4 


 


AST  22 


 


ALT  32 


 


Alkaline Phosphatase  55 


 


Lactate Dehydrogenase  


 


Total Protein  6.5 


 


Albumin  3.5  D 


 


Globulin  3.0 


 


Albumin/Globulin Ratio  1.2 


 


Triglycerides  161 H 


 


Cholesterol  174 


 


LDL Cholesterol Direct  97 


 


HDL Cholesterol  38 


 


Amylase  


 


Lipase  














Assessment & Plan


(1) Abdominal pain


Status: Acute   





(2) Pancreatitis


Status: Acute   





(3) Hypertriglyceridemia


Status: Chronic   





- Assessment and Plan (Free Text)


Plan: 





c/w present management


afebrile, non-tachycardic, normotensive


GI consult ordered


CBC: 7.1>12.5/37.0<216


CMP: 147/4.2, 110/24, 15/0.7, glucose 92, AST 22, ALT 32, alk phos 55


lipase: 2334


lipid profile: trig 161, chol 174, LDL 97, HDl 38


U/S gallbladder: no gallstones


maintain NPO for now


IVF D5 LR @ 160 mL/h


pain management: toradol 15 mg Iv Q6h prn, morphine 4 mg IV Q6h prn


f/u U/S abdomen


f/u lipase


prophylactic measures: DVT SCDs


monitor for acute changes

## 2018-04-18 NOTE — US
HISTORY:

RUQ pain



COMPARISON:

None.



TECHNIQUE:

Sonographic evaluation of the right upper quadrant of the abdomen.



FINDINGS:



LIVER:

Measures 15.0 cm in length. Patent portal vein. Portal venous flow: 

Hepatopetal. Unremarkable echogenicity of the liver parenchyma.  No 

mass. No intrahepatic bile duct dilatation. 



GALLBLADDER:

Unremarkable. No gallstones. 



COMMON BILE DUCT:

Measures 30.6 mm.  No stones. No dilatation.



PANCREAS:

Unremarkable as visualized. No mass. No ductal dilatation.



RIGHT KIDNEY:

Measures 4.3 x 9.4 cm in length. Normal echogenicity. No calculus, 

mass, or hydronephrosis.



AORTA:

No aneurysmal dilatation.



IVC:

Unremarkable.



OTHER FINDINGS:

None .



IMPRESSION:

No significant or acute  findings to account for/ related to the 

clinical presentation.



________________________________________________



Concordant results (preliminary interpretation) provided by [facility].



Procedure Completed: 20:06



Preliminary ([facility]) Report: Dictated and Authenticated: 20:45



Final Interpretation: 09:04 April 18, 2018.

## 2018-04-19 VITALS — HEART RATE: 67 BPM | DIASTOLIC BLOOD PRESSURE: 79 MMHG | SYSTOLIC BLOOD PRESSURE: 124 MMHG | TEMPERATURE: 98 F

## 2018-04-19 VITALS — RESPIRATION RATE: 20 BRPM

## 2018-04-19 NOTE — CP.PCM.PN
Subjective





- Date & Time of Evaluation


Date of Evaluation: 04/19/18


Time of Evaluation: 11:00





- Subjective


Subjective: 





Patient seen and examined this morning at bedside w Dr. Monroy.  There are no 

acute overnight, NAD.  The patient is laying down in bed comfortably.  The 

patient reports much improvement w/ abdominal pain.  Patient is tolerating PO 

liquids.  Patient sen by GI and will advance diet.  Patient denies headaches, 

chest pain, SOB, nausea, vomiting, diarrhea, dysuria, or fever.





Objective





- Vital Signs/Intake and Output


Vital Signs (last 24 hours): 


 











Temp Pulse Resp BP Pulse Ox


 


 98.4 F   65   20   133/82   97 


 


 04/19/18 08:43  04/19/18 08:43  04/19/18 08:43  04/19/18 08:43  04/19/18 08:43











- Medications


Medications: 


 Current Medications





Famotidine (Pepcid)  20 mg IVP DAILY [facility]


   Last Admin: 04/19/18 08:39 Dose:  20 mg


Dextrose/Lactated Ringer's (Dextrose 5%/Lactated Ringer's)  1,000 mls @ 160 mls/

hr IV .Q6H15M [facility]


   Stop: 04/20/18 08:53


   Last Admin: 04/19/18 10:00 Dose:  160 mls/hr


Ketorolac Tromethamine (Toradol)  15 mg IVP Q6 PRN


   PRN Reason: Pain, moderate (4-7)


   Last Admin: 04/18/18 09:38 Dose:  15 mg


Metoclopramide HCl (Reglan)  10 mg IVP Q6 PRN


   PRN Reason: Nausea/Vomiting


   Last Admin: 04/18/18 21:08 Dose:  10 mg


Morphine Sulfate (Morphine)  4 mg IVP Q6 PRN


   PRN Reason: Pain, severe (8-10)


   Last Admin: 04/18/18 21:09 Dose:  4 mg











- Labs


Labs: 


 





 04/18/18 06:10 





 04/18/18 06:10 





 











PT  10.7 Seconds (9.8-13.1)   04/17/18  19:41    


 


INR  1.0  (0.9-1.2)   04/17/18  19:41    


 


APTT  36.1 Seconds (25.6-37.1)   04/17/18  19:41    














- Constitutional


Appears: Non-toxic, No Acute Distress





- Head Exam


Head Exam: ATRAUMATIC, NORMAL INSPECTION, NORMOCEPHALIC





- Eye Exam


Eye Exam: Normal appearance





- ENT Exam


ENT Exam: Mucous Membranes Moist





- Neck Exam


Neck Exam: Full ROM.  absent: Tenderness





- Respiratory Exam


Respiratory Exam: Clear to Ausculation Bilateral.  absent: Accessory Muscle Use

, Decreased Breath Sounds, Rales, Rhonchi, Wheezes, Respiratory Distress





- Cardiovascular Exam


Cardiovascular Exam: REGULAR RHYTHM.  absent: Tachycardia





- GI/Abdominal Exam


GI & Abdominal Exam: Soft, Normal Bowel Sounds.  absent: Distended, Tenderness





- Extremities Exam


Extremities Exam: absent: Calf Tenderness, Pedal Edema, Tenderness





- Neurological Exam


Neurological Exam: Alert, Awake, Oriented x3





- Skin


Skin Exam: Dry, Intact, Normal Color, Warm





Assessment and Plan


(1) Abdominal pain


Status: Acute   





(2) Pancreatitis


Status: Acute   





(3) Hypertriglyceridemia


Status: Chronic   





- Assessment and Plan (Free Text)


Plan: 





c/w present management


afebrile, non-tachycardic, normotensive


GI recommendations appreciated


CBC: 7.1>12.5/37.0<216


CMP: 147/4.2, 110/24, 15/0.7, glucose 92, AST 22, ALT 32, alk phos 55


lipase: 1386


lipid profile: trig 161, chol 174, LDL 97, HDl 38


U/S gallbladder: no gallstones


tolerated liquid diet advanced to low fat diet


pain management: toradol 15 mg Iv Q6h prn, morphine 4 mg IV Q6h prn


prophylactic measures: DVT SCDs


monitor for acute changes


dispo DC to home

## 2018-04-19 NOTE — CP.PCM.DIS
Provider





- Provider


Date of Admission: 


04/17/18 21:21





Attending physician: 


Sanju Monroy MD





Time Spent in preparation of Discharge (in minutes): 15





Diagnosis





- Discharge Diagnosis


(1) Abdominal pain


Status: Acute   





(2) Pancreatitis


Status: Acute   





(3) Hypertriglyceridemia


Status: Chronic   





Hospital Course





- Lab Results


Lab Results: 


 Most Recent Lab Values











WBC  7.1 K/uL (4.8-10.8)   04/18/18  06:10    


 


RBC  4.15 Mil/uL (3.80-5.20)   04/18/18  06:10    


 


Hgb  12.5 g/dL (12.0-16.0)   04/18/18  06:10    


 


Hct  37.0 % (34.0-47.0)   04/18/18  06:10    


 


MCV  89.1 fl (81.0-99.0)   04/18/18  06:10    


 


MCH  30.0 pg (27.0-31.0)   04/18/18  06:10    


 


MCHC  33.7 g/dL (33.0-37.0)   04/18/18  06:10    


 


RDW  13.2 % (11.5-14.5)   04/18/18  06:10    


 


Plt Count  216 K/uL (130-400)   04/18/18  06:10    


 


MPV  8.7 fl (7.2-11.7)   04/18/18  06:10    


 


Neut % (Auto)  53.5 % (50.0-75.0)   04/18/18  06:10    


 


Lymph % (Auto)  34.4 % (20.0-40.0)   04/18/18  06:10    


 


Mono % (Auto)  8.5 % (0.0-10.0)   04/18/18  06:10    


 


Eos % (Auto)  2.9 % (0.0-4.0)   04/18/18  06:10    


 


Baso % (Auto)  0.7 % (0.0-2.0)   04/18/18  06:10    


 


Neut # (Auto)  3.8 K/uL (1.8-7.0)   04/18/18  06:10    


 


Lymph # (Auto)  2.4 K/uL (1.0-4.3)   04/18/18  06:10    


 


Mono # (Auto)  0.6 K/uL (0.0-0.8)   04/18/18  06:10    


 


Eos # (Auto)  0.2 K/uL (0.0-0.7)   04/18/18  06:10    


 


Baso # (Auto)  0.1 K/uL (0.0-0.2)   04/18/18  06:10    


 


PT  10.7 Seconds (9.8-13.1)   04/17/18  19:41    


 


INR  1.0  (0.9-1.2)   04/17/18  19:41    


 


APTT  36.1 Seconds (25.6-37.1)   04/17/18  19:41    


 


Sodium  147 mmol/l (132-148)   04/18/18  06:10    


 


Potassium  4.2 MMOL/L (3.6-5.0)   04/18/18  06:10    


 


Chloride  110 mmol/L ()  H  04/18/18  06:10    


 


Carbon Dioxide  24 mmol/L (22-30)   04/18/18  06:10    


 


Anion Gap  17  (10-20)   04/18/18  06:10    


 


BUN  15 mg/dl (7-17)   04/18/18  06:10    


 


Creatinine  0.7 mg/dl (0.7-1.2)   04/18/18  06:10    


 


Est GFR ( Amer)  > 60   04/18/18  06:10    


 


Est GFR (Non-Af Amer)  > 60   04/18/18  06:10    


 


Random Glucose  92 mg/dL ()   04/18/18  06:10    


 


Calcium  8.9 mg/dL (8.4-10.2)   04/18/18  06:10    


 


Total Bilirubin  0.4 mg/dl (0.2-1.3)   04/18/18  06:10    


 


AST  22 U/L (14-36)   04/18/18  06:10    


 


ALT  32 U/L (9-52)   04/18/18  06:10    


 


Alkaline Phosphatase  55 U/L ()   04/18/18  06:10    


 


Lactate Dehydrogenase  445 U/L (313-618)   04/17/18  19:41    


 


Total Protein  6.5 G/DL (6.3-8.2)   04/18/18  06:10    


 


Albumin  3.5 g/dL (3.5-5.0)  D 04/18/18  06:10    


 


Globulin  3.0 gm/dL (2.2-3.9)   04/18/18  06:10    


 


Albumin/Globulin Ratio  1.2  (1.0-2.1)   04/18/18  06:10    


 


Triglycerides  161 mg/DL (0-149)  H  04/18/18  06:10    


 


Cholesterol  174 mg/dL (0-199)   04/18/18  06:10    


 


LDL Cholesterol Direct  97 mg/dL (0-129)   04/18/18  06:10    


 


HDL Cholesterol  38 MG/DL (30-70)   04/18/18  06:10    


 


Amylase  257 U/L ()  H  04/17/18  19:41    


 


Lipase  1386 U/L ()  H  04/18/18  13:00    














- Hospital Course


Hospital Course: 





51 y/o woman w/ pmh of high triglycerides presented to ED with complaints of 

abdominal pain.  Patient seen and evaluated in the ED.  Patient found to have 

acute pancreatitis.  Patient made NPO and started on IVF.  Patient reported 

progressive improvement w/ pain.  Patient seen by GI.  Patient diet advanced to 

liquids which patient tolerated.  Patient advanced to low fat diet which the 

patient tolerated.  The patient reports minimal pain.  The patient has been seen

, examined, and deemed medically fit for discharge home.  The patient is to 

follow up w/ PMD Dr. Monroy and GI in 1 week.  The patient has been given an 

updated medication list and prescriptions.





Discharge Exam





- Head Exam


Head Exam: ATRAUMATIC, NORMAL INSPECTION, NORMOCEPHALIC





- Eye Exam


Eye Exam: Normal appearance





- ENT Exam


ENT Exam: Mucous Membranes Moist





- Neck Exam


Neck exam: Full Rom





- Respiratory Exam


Respiratory Exam: Clear to PA & Lateral.  absent: Accessory Muscle Use, 

Decreased Breath Sounds, Rales, Rhonchi, Wheezes, Respiratory Distress





- Cardiovascular Exam


Cardiovascular Exam: REGULAR RHYTHM.  absent: Tachycardia





- GI/Abdominal Exam


GI & Abdominal Exam: Normal Bowel Sounds, Soft.  absent: Distended, Tenderness





- Extremities Exam


Extremities exam: normal inspection





- Neurological Exam


Neurological exam: Alert, Normal Gait, Oriented x3





- Skin


Skin Exam: Dry, Intact, Normal Color, Warm





Discharge Plan





- Discharge Medications


Prescriptions: 


Famotidine [Pepcid] 20 mg PO DAILY #30 tab





- Follow Up Plan


Condition: GOOD


Disposition: HOME/ ROUTINE


Referrals: 


Sanju Monroy MD [Staff Provider] - 


Marlon Smalls MD, PhD [Staff Provider] -

## 2018-04-19 NOTE — CP.PCM.PN
Subjective





- Date & Time of Evaluation


Date of Evaluation: 04/19/18


Time of Evaluation: 08:00





- Subjective


Subjective: 


GI Fellow PGY4 Progress Note


Patient seen and evaluated at bedside, pt doing well with no more abdominal pain

, nausea or vomiting. Pt tolerating clear liquid diet this am. BM was 2 days ago

, denies constipation or diarrhea.


ROS: A 12pt ROS was negative except as above











Objective





- Vital Signs/Intake and Output


Vital Signs (last 24 hours): 


 











Temp Pulse Resp BP Pulse Ox


 


 98.4 F   65   20   133/82   97 


 


 04/19/18 08:43  04/19/18 08:43  04/19/18 08:43  04/19/18 08:43  04/19/18 08:43











- Medications


Medications: 


 Current Medications





Famotidine (Pepcid)  20 mg IVP DAILY [facility]


   Last Admin: 04/19/18 08:39 Dose:  20 mg


Dextrose/Lactated Ringer's (Dextrose 5%/Lactated Ringer's)  1,000 mls @ 160 mls/

hr IV .Q6H15M [facility]


   Stop: 04/20/18 08:53


Ketorolac Tromethamine (Toradol)  15 mg IVP Q6 PRN


   PRN Reason: Pain, moderate (4-7)


   Last Admin: 04/18/18 09:38 Dose:  15 mg


Metoclopramide HCl (Reglan)  10 mg IVP Q6 PRN


   PRN Reason: Nausea/Vomiting


   Last Admin: 04/18/18 21:08 Dose:  10 mg


Morphine Sulfate (Morphine)  4 mg IVP Q6 PRN


   PRN Reason: Pain, severe (8-10)


   Last Admin: 04/18/18 21:09 Dose:  4 mg











- Labs


Labs: 


 





 04/18/18 06:10 





 04/18/18 06:10 





 











PT  10.7 Seconds (9.8-13.1)   04/17/18  19:41    


 


INR  1.0  (0.9-1.2)   04/17/18  19:41    


 


APTT  36.1 Seconds (25.6-37.1)   04/17/18  19:41    














- Constitutional


Appears: Non-toxic, No Acute Distress





- Head Exam


Head Exam: ATRAUMATIC, NORMAL INSPECTION, NORMOCEPHALIC





- Eye Exam


Eye Exam: EOMI, Normal appearance, PERRL


Pupil Exam: PERRL





- ENT Exam


ENT Exam: Mucous Membranes Moist





- Neck Exam


Neck Exam: Normal Inspection





- Respiratory Exam


Respiratory Exam: Clear to Ausculation Bilateral, NORMAL BREATHING PATTERN





- Cardiovascular Exam


Cardiovascular Exam: REGULAR RHYTHM, RRR, +S1, +S2





- GI/Abdominal Exam


GI & Abdominal Exam: Soft, Normal Bowel Sounds.  absent: Distended, Firm, 

Guarding, Rigid, Tenderness, Organomegaly





- Rectal Exam


Rectal Exam: Deferred





- Extremities Exam


Extremities Exam: Full ROM, Normal Inspection





- Back Exam


Back Exam: NORMAL INSPECTION





- Neurological Exam


Neurological Exam: Alert, Awake, Oriented x3





- Psychiatric Exam


Psychiatric exam: Normal Affect, Normal Mood





- Skin


Skin Exam: Dry, Intact, Normal Color, Warm





Assessment and Plan





- Assessment and Plan (Free Text)


Assessment: 


This is a 52yF presenting with complaints of abdominal pain.


1. Acute pancreatitis





Plan: 


-Continue supportive care with pain control and anti-emetics


-Acute pancreatitis with clinical improvement, unclear etiology with Abd us 

negative for gallstones, no hx of etoh, triglyceride mildly elevated usually >1,

000 for acute pancreatitis


-Recommend outpt CT scan maybe 2/2 pancreatic divisium


-IVF hydration


-Advance to low fat diet


-If pt tolerates diet with no pain okay for discharge home and outpt followup


-Will sign off please call with any questions or concerns

## 2018-04-19 NOTE — CON
DATE:  04/18/2018



REFERRING DOCTOR:  Sanju Monroy MD



REASON FOR CONSULTATION:  Abdominal pain.



HISTORY OF PRESENT ILLNESS:  This is a very selwyn 52-year-old female with

a history of hypertriglyceridemia, now comes in with abdominal pain and

comfort on right side.  All symptoms resolved.  Nausea and vomiting

improved.  She had _____ in the past.  No fever or chills.  At this point

asking for food, lying in bed comfortably, and in no apparent distress.



PAST MEDICAL HISTORY:  As above.



PAST SURGICAL HISTORY:  As above.



MEDICATIONS:  Have been reviewed.



REVIEW OF SYSTEMS:  All other systems have been reviewed and negative apart

from the HPI.



PHYSICAL EXAMINATION:

GENERAL:  This is a pleasant, middle age female lying in bed comfortably,

in no apparent distress.

VITAL SIGNS:  During here in the hospital, grossly unremarkable.

HEENT:  Head is normocephalic atraumatic.  Eyes, pupils are equally

reactive to light bilaterally.  No conjunctival pallor or icterus.

NECK:  Supple.  Normal range of motion.  No lymphadenopathy appreciated.

LUNGS:  Coarse breath sounds bilaterally.

HEART:  S1 and S2, regular rate and rhythm.  No murmurs appreciated.

ABDOMEN:  Soft and nontender.  Bowel sounds present.  No rebound.  No

guarding.

RECTAL:  Deferred.

EXTREMITIES:  Pulses present bilaterally.

SKIN:  Warm, dry and intact.

NEUROLOGIC:  A and O x3.



LABORATORY DATA:  Labs and radiology have been reviewed.  Labs include WBC

of 7.1, hemoglobin 12.5, and hematocrit 37, _____.



Gallbladder ultrasound shows no significant findings.  No gallstones.











ASSESSMENT AND PLAN:  This is a 52-year-old female with pancreatitis.  At

this point, the patient is doing better, advance diet as tolerated, and IV

hydration until pain completely resolved.  Pain control as needed.  Zofran

as needed.



Thank you for the consult.





__________________________________________

Marlon Smalls MD/ PhD





cc:  Sanju Monroy MD





DD:  04/18/2018 16:18:04

DT:  04/18/2018 19:02:09

Job # [id number]

## 2018-04-20 VITALS — OXYGEN SATURATION: 99 %
